# Patient Record
Sex: MALE | Race: WHITE | NOT HISPANIC OR LATINO | Employment: FULL TIME | URBAN - METROPOLITAN AREA
[De-identification: names, ages, dates, MRNs, and addresses within clinical notes are randomized per-mention and may not be internally consistent; named-entity substitution may affect disease eponyms.]

---

## 2023-03-29 ENCOUNTER — OFFICE VISIT (OUTPATIENT)
Dept: URGENT CARE | Facility: CLINIC | Age: 53
End: 2023-03-29

## 2023-03-29 ENCOUNTER — APPOINTMENT (OUTPATIENT)
Dept: RADIOLOGY | Facility: CLINIC | Age: 53
End: 2023-03-29

## 2023-03-29 VITALS
RESPIRATION RATE: 17 BRPM | DIASTOLIC BLOOD PRESSURE: 79 MMHG | TEMPERATURE: 97.8 F | OXYGEN SATURATION: 99 % | SYSTOLIC BLOOD PRESSURE: 126 MMHG | HEART RATE: 64 BPM

## 2023-03-29 DIAGNOSIS — M79.672 LEFT FOOT PAIN: Primary | ICD-10-CM

## 2023-03-29 DIAGNOSIS — M79.672 LEFT FOOT PAIN: ICD-10-CM

## 2023-03-29 RX ORDER — NAPROXEN 500 MG/1
500 TABLET ORAL 2 TIMES DAILY WITH MEALS
Qty: 30 TABLET | Refills: 0 | Status: SHIPPED | OUTPATIENT
Start: 2023-03-29

## 2023-03-29 RX ORDER — ESOMEPRAZOLE MAGNESIUM 10 MG/1
10 GRANULE, FOR SUSPENSION, EXTENDED RELEASE ORAL
COMMUNITY

## 2023-03-29 NOTE — PROGRESS NOTES
[facility]        NAME: Mirna Zaragoza is a 47 y o  male  : 1970    MRN: [id number]  DATE: 2023  TIME: 2:52 PM    Assessment and Plan   Left foot pain [M79 672]  1  Left foot pain  XR foot 3+ vw left    Ambulatory referral to Orthopedic Surgery    naproxen (Naprosyn) 500 mg tablet        - Left foot XR did not reveal acute fracture  Will follow up on offical radiology read  - Recommended Naproxen BID and frequent icing and following up with Orthopedics       Patient Instructions       Follow up with PCP in 3-5 days  Proceed to  ER if symptoms worsen  Chief Complaint     Chief Complaint   Patient presents with   • Foot Pain     Pt c/o left foot pain for past couple weeks  Pain is in inner area, bottom and back of heel  Sore to touch, hurts to bend, move, walk  Pain 2/10 sitting and walking /10  History of Present Illness       Patient is a 48 yo male who presents for evaluation of left foot pain x 2-3 weeks  He denies any specific injury or trauma to the foot  Pain is located in the heel and inner portion of the foot  Pain is worse with walking and movement  He also states he has noticed that the pain increases after sitting and not walking for a while  He has taken Aleve a few times with some relief  No numbness, tingling, weakness, reduced ROM      Review of Systems   Review of Systems   Musculoskeletal: Positive for arthralgias  Negative for gait problem and joint swelling  Left foot pain   Skin: Negative for color change  Neurological: Negative for weakness and numbness           Current Medications       Current Outpatient Medications:   •  esomeprazole (NexIUM) 10 MG packet, Take 10 mg by mouth every morning before breakfast Q 3 days as pwe pt, Disp: , Rfl:   •  naproxen (Naprosyn) 500 mg tablet, Take 1 tablet (500 mg total) by mouth 2 (two) times a day with meals, Disp: 30 tablet, Rfl: 0    Current Allergies     Allergies as of 2023 - Reviewed 2023 Allergen Reaction Noted   • Penicillins Other (See Comments) 11/22/2003            The following portions of the patient's history were reviewed and updated as appropriate: allergies, current medications, past family history, past medical history, past social history, past surgical history and problem list      History reviewed  No pertinent past medical history  History reviewed  No pertinent surgical history  History reviewed  No pertinent family history  Medications have been verified  Objective   /79   Pulse 64   Temp 97 8 °F (36 6 °C)   Resp 17   SpO2 99%        Physical Exam     Physical Exam  Constitutional:       General: He is not in acute distress  Appearance: He is not toxic-appearing  Cardiovascular:      Rate and Rhythm: Normal rate  Pulmonary:      Effort: Pulmonary effort is normal    Musculoskeletal:      Comments: Left heel and inner arch of foot mildly tender  No skin changes  No edema  Full ROM  Sensation intact  DP pulse 2+   Skin:     General: Skin is warm and dry  Coloration: Skin is not pale  Findings: No bruising or erythema  Neurological:      Mental Status: He is alert     Psychiatric:         Mood and Affect: Mood normal          Behavior: Behavior normal

## 2023-03-30 VITALS
BODY MASS INDEX: 26.82 KG/M2 | WEIGHT: 191.6 LBS | RESPIRATION RATE: 18 BRPM | HEIGHT: 71 IN | SYSTOLIC BLOOD PRESSURE: 126 MMHG | HEART RATE: 79 BPM | DIASTOLIC BLOOD PRESSURE: 73 MMHG

## 2023-03-30 DIAGNOSIS — M72.2 PLANTAR FASCIITIS OF LEFT FOOT: Primary | ICD-10-CM

## 2023-03-30 DIAGNOSIS — M77.52 RETROCALCANEAL BURSITIS (BACK OF HEEL), LEFT: ICD-10-CM

## 2023-03-30 NOTE — LETTER
March 30, 2023     Patient: Poli Garrido  YOB: 1970  Date of Visit: 3/30/2023      To Whom it May Concern:    Poli Garrido is under my professional care  Shonna Navarrete was seen in my office on 3/30/2023  Patient was seen for left ankle and foot pain  I am recommending that patient remain on light duty with avoidance of prolonged walking, standing > 30 minutes at a time  Please allow patient to rest as needed  If you have any questions or concerns, please don't hesitate to call           Sincerely,          Olga Clark,         CC: Poli Garrido

## 2023-03-30 NOTE — PROGRESS NOTES
"Subjective:    Chief Complaint   Patient presents with   • Left Foot - Pain       Monse Dixon is a 47 y o  male complains of left foot pain  Onset of the symptoms was several weeks ago  Mechanism of injury: no injury reported; however states that he has increased his activity at work  Aggravating factors: worst with first step in the morning and slowly subsides throughout the day  Treatment to date: ice, OTC analgesics which are effective and foot orthotics  Symptoms have gradually improved  Patient denies any pain in office today  States he did a warm water bath yesterday and states that this helped his pain symptoms  Pain typically occurs originating in the posterior axis of the heel and radiates along the plantar aspect of the foot  The following portions of the patient's history were reviewed and updated as appropriate: allergies, current medications, past family history, past medical history, past social history, past surgical history and problem list     Occupation:      Review of Systems   Constitutional: Negative for fever  HENT: Negative for dental problem and headaches  Eyes: Negative for vision loss  Respiratory: Negative for cough and shortness of breath  Cardiovascular: Negative for leg swelling and palpitations  Gastrointestinal: Negative for constipation and diarrhea  Genitourinary: Negative for bladder incontinence and difficulty urinating  Musculoskeletal: Negative for back pain and difficulty walking  Skin: Negative for rash and ulcer  Neurological: Negative for dizziness and headaches  Hem/Lymph/Immuno: Negative for blood clots  Does not bruise/bleed easily  Psychiatric/Behavioral: Negative for confusion  Objective:  /73   Pulse 79   Resp 18   Ht 5' 11\" (1 803 m)   Wt 86 9 kg (191 lb 9 6 oz)   BMI 26 72 kg/m²     General: Awake, alert, and oriented x3 in no apparent distress    Skin: no rashes, lesions, skin discolorations, lacerations, " ulcerations  Vasculature: DP palpated  PT palpated  Capillary refill normal  Musculoskeletal: Left foot  Inspection: edema negative ecchymosis negative, effusion negative  Palpation: TTP over insertion of plantar fascia  ROM: full  plantarflexion  Limited dorsiflexion  Special test: negativefulcrum  Tenderness to palpation in the retrocalcaneal bursa  No tenderness palpation of the Achilles  Positive windlass test    Imaging:  XR foot 3+ vw left    Result Date: 3/30/2023  Narrative: LEFT FOOT INDICATION:   M79 672: Pain in left foot  COMPARISON:  None VIEWS:  XR FOOT 3+ VW LEFT FINDINGS: There is no acute fracture or dislocation  Calcaneal spur(s) noted  No lytic or blastic osseous lesion  Soft tissues are unremarkable  Impression: No acute osseous abnormality  Workstation performed: [id number]        Assessment/Plan:  1  Plantar fasciitis of left foot    - Ambulatory referral to Orthopedic Surgery  - Ambulatory Referral to Physical Therapy; Future    2  Retrocalcaneal bursitis (back of heel), left    - Ambulatory Referral to Physical Therapy; Future        > 30 min devoted to review of previous, pertinent medical records, imaging, discussion of treatment options, counseling and documentation  We discussed the nature of plantar fascitis/retrocalcenal bursiits at length and detailed the treatment approach  Directed to ice the area daily for 20 minutes at a time using a barrier to protect the skin- stressed specifically icing after activity to address inflammation  Start Naproxen 500 mg PO BID for 10 days with food, then to be used as needed moving forward  They were instructed to discontinue this medication is they experienced any upset stomach  Recommending formal PT- Rx provided for PT  Continuing foot orthotic  We discussed a HEP and literature was provided for reference  It was explained that this does not supplement formal PT but should serve to bridge the gap, while they wait to get into PT   Advised to avoid any exercises which exacerbate their pain  Follow up in 8 weeks  Should sx's worsen or any concerns arise, they were advised to follow up sooner or seek more immediate medical attention  All of the patient's concerns were addressed and questions answered  They verbalized agreement with and understanding of the treatment plan  good balance